# Patient Record
Sex: FEMALE | Race: WHITE | NOT HISPANIC OR LATINO | URBAN - METROPOLITAN AREA
[De-identification: names, ages, dates, MRNs, and addresses within clinical notes are randomized per-mention and may not be internally consistent; named-entity substitution may affect disease eponyms.]

---

## 2017-03-08 ENCOUNTER — APPOINTMENT (OUTPATIENT)
Dept: WOMENS IMAGING | Facility: HOSPITAL | Age: 67
End: 2017-03-08

## 2017-03-08 PROCEDURE — G0202 SCR MAMMO BI INCL CAD: HCPCS | Performed by: RADIOLOGY

## 2017-03-08 PROCEDURE — 77063 BREAST TOMOSYNTHESIS BI: CPT | Performed by: RADIOLOGY

## 2017-03-08 PROCEDURE — MDREVIEWSP: Performed by: RADIOLOGY

## 2018-03-22 ENCOUNTER — APPOINTMENT (OUTPATIENT)
Dept: WOMENS IMAGING | Facility: HOSPITAL | Age: 68
End: 2018-03-22

## 2018-03-22 PROCEDURE — 77067 SCR MAMMO BI INCL CAD: CPT | Performed by: RADIOLOGY

## 2018-03-22 PROCEDURE — MDREVIEWSP: Performed by: RADIOLOGY

## 2018-03-22 PROCEDURE — 77063 BREAST TOMOSYNTHESIS BI: CPT | Performed by: RADIOLOGY

## 2019-03-27 ENCOUNTER — APPOINTMENT (OUTPATIENT)
Dept: WOMENS IMAGING | Facility: HOSPITAL | Age: 69
End: 2019-03-27

## 2019-03-27 PROCEDURE — 77063 BREAST TOMOSYNTHESIS BI: CPT | Performed by: RADIOLOGY

## 2019-03-27 PROCEDURE — 77067 SCR MAMMO BI INCL CAD: CPT | Performed by: RADIOLOGY

## 2019-03-27 PROCEDURE — MDREVIEWSP: Performed by: RADIOLOGY

## 2019-08-15 ENCOUNTER — OFFICE (OUTPATIENT)
Dept: URBAN - METROPOLITAN AREA CLINIC 42 | Facility: CLINIC | Age: 69
End: 2019-08-15

## 2019-08-15 VITALS
DIASTOLIC BLOOD PRESSURE: 80 MMHG | TEMPERATURE: 97.8 F | SYSTOLIC BLOOD PRESSURE: 141 MMHG | WEIGHT: 159 LBS | HEART RATE: 69 BPM | HEIGHT: 66 IN

## 2019-08-15 DIAGNOSIS — K59.00 CONSTIPATION, UNSPECIFIED: ICD-10-CM

## 2019-08-15 DIAGNOSIS — K30 FUNCTIONAL DYSPEPSIA: ICD-10-CM

## 2019-08-15 DIAGNOSIS — R10.84 GENERALIZED ABDOMINAL PAIN: ICD-10-CM

## 2019-08-15 DIAGNOSIS — R14.2 ERUCTATION: ICD-10-CM

## 2019-08-15 DIAGNOSIS — R85.89 OTHER ABNORMAL FINDINGS IN SPECIMENS FROM DIGESTIVE ORGANS A: ICD-10-CM

## 2019-08-15 PROCEDURE — 99204 OFFICE O/P NEW MOD 45 MIN: CPT

## 2019-08-30 ENCOUNTER — ON CAMPUS - OUTPATIENT (OUTPATIENT)
Dept: URBAN - METROPOLITAN AREA HOSPITAL 91 | Facility: HOSPITAL | Age: 69
End: 2019-08-30
Payer: MEDICARE

## 2019-08-30 DIAGNOSIS — R14.0 ABDOMINAL DISTENSION (GASEOUS): ICD-10-CM

## 2019-08-30 DIAGNOSIS — D12.5 BENIGN NEOPLASM OF SIGMOID COLON: ICD-10-CM

## 2019-08-30 DIAGNOSIS — K22.10 ULCER OF ESOPHAGUS WITHOUT BLEEDING: ICD-10-CM

## 2019-08-30 DIAGNOSIS — K29.71 GASTRITIS, UNSPECIFIED, WITH BLEEDING: ICD-10-CM

## 2019-08-30 DIAGNOSIS — R10.12 LEFT UPPER QUADRANT PAIN: ICD-10-CM

## 2019-08-30 DIAGNOSIS — K22.2 ESOPHAGEAL OBSTRUCTION: ICD-10-CM

## 2019-08-30 DIAGNOSIS — Z12.11 ENCOUNTER FOR SCREENING FOR MALIGNANT NEOPLASM OF COLON: ICD-10-CM

## 2019-08-30 DIAGNOSIS — D12.2 BENIGN NEOPLASM OF ASCENDING COLON: ICD-10-CM

## 2019-08-30 DIAGNOSIS — D13.2 BENIGN NEOPLASM OF DUODENUM: ICD-10-CM

## 2019-08-30 DIAGNOSIS — R19.5 OTHER FECAL ABNORMALITIES: ICD-10-CM

## 2019-08-30 DIAGNOSIS — K29.80 DUODENITIS WITHOUT BLEEDING: ICD-10-CM

## 2019-08-30 DIAGNOSIS — R10.13 EPIGASTRIC PAIN: ICD-10-CM

## 2019-08-30 PROCEDURE — 43239 EGD BIOPSY SINGLE/MULTIPLE: CPT | Mod: 59

## 2019-08-30 PROCEDURE — 45385 COLONOSCOPY W/LESION REMOVAL: CPT | Mod: PT

## 2019-08-30 PROCEDURE — 45381 COLONOSCOPY SUBMUCOUS NJX: CPT | Mod: PT

## 2019-08-30 PROCEDURE — 43249 ESOPH EGD DILATION <30 MM: CPT

## 2019-09-26 ENCOUNTER — OFFICE (OUTPATIENT)
Dept: URBAN - METROPOLITAN AREA CLINIC 42 | Facility: CLINIC | Age: 69
End: 2019-09-26

## 2019-09-26 VITALS
HEIGHT: 66 IN | TEMPERATURE: 98.4 F | SYSTOLIC BLOOD PRESSURE: 141 MMHG | HEART RATE: 59 BPM | DIASTOLIC BLOOD PRESSURE: 81 MMHG | WEIGHT: 156 LBS

## 2019-09-26 DIAGNOSIS — K30 FUNCTIONAL DYSPEPSIA: ICD-10-CM

## 2019-09-26 DIAGNOSIS — K22.2 ESOPHAGEAL OBSTRUCTION: ICD-10-CM

## 2019-09-26 DIAGNOSIS — D12.6 BENIGN NEOPLASM OF COLON, UNSPECIFIED: ICD-10-CM

## 2019-09-26 DIAGNOSIS — K21.9 GASTRO-ESOPHAGEAL REFLUX DISEASE WITHOUT ESOPHAGITIS: ICD-10-CM

## 2019-09-26 PROCEDURE — 99213 OFFICE O/P EST LOW 20 MIN: CPT

## 2020-05-05 ENCOUNTER — APPOINTMENT (OUTPATIENT)
Dept: WOMENS IMAGING | Facility: HOSPITAL | Age: 70
End: 2020-05-05

## 2020-05-05 PROCEDURE — 77067 SCR MAMMO BI INCL CAD: CPT | Performed by: RADIOLOGY

## 2020-05-05 PROCEDURE — 77063 BREAST TOMOSYNTHESIS BI: CPT | Performed by: RADIOLOGY

## 2021-05-07 ENCOUNTER — APPOINTMENT (OUTPATIENT)
Dept: WOMENS IMAGING | Facility: HOSPITAL | Age: 71
End: 2021-05-07

## 2021-05-07 PROCEDURE — 77067 SCR MAMMO BI INCL CAD: CPT | Performed by: RADIOLOGY

## 2021-05-07 PROCEDURE — 77063 BREAST TOMOSYNTHESIS BI: CPT | Performed by: RADIOLOGY

## 2022-06-06 ENCOUNTER — APPOINTMENT (OUTPATIENT)
Dept: WOMENS IMAGING | Facility: HOSPITAL | Age: 72
End: 2022-06-06

## 2022-06-06 PROCEDURE — 77067 SCR MAMMO BI INCL CAD: CPT | Performed by: RADIOLOGY

## 2022-06-06 PROCEDURE — 77063 BREAST TOMOSYNTHESIS BI: CPT | Performed by: RADIOLOGY

## 2024-02-22 ENCOUNTER — TELEPHONE (OUTPATIENT)
Dept: RADIATION ONCOLOGY | Facility: HOSPITAL | Age: 74
End: 2024-02-22
Payer: MEDICARE

## 2024-02-22 NOTE — TELEPHONE ENCOUNTER
Pt called to speak about an appointment with Dr. Cain. She has decided to wait until we are able to obtain her records to schedule an appointment. We will give her a call back.

## 2024-02-27 ENCOUNTER — TELEPHONE (OUTPATIENT)
Dept: RADIATION ONCOLOGY | Facility: HOSPITAL | Age: 74
End: 2024-02-27
Payer: MEDICARE

## 2024-02-27 NOTE — TELEPHONE ENCOUNTER
Called pt to let her know I was able to get her records from [facility]. We can go ahead and schedule her with Dr. Cain at her convenience. Pt did not answer, LVM.

## 2024-03-08 ENCOUNTER — TELEPHONE (OUTPATIENT)
Dept: RADIATION ONCOLOGY | Facility: HOSPITAL | Age: 74
End: 2024-03-08
Payer: MEDICARE

## 2024-03-11 ENCOUNTER — LAB (OUTPATIENT)
Dept: LAB | Facility: HOSPITAL | Age: 74
End: 2024-03-11
Payer: MEDICARE

## 2024-03-11 ENCOUNTER — OFFICE VISIT (OUTPATIENT)
Dept: RADIATION ONCOLOGY | Facility: HOSPITAL | Age: 74
End: 2024-03-11
Payer: MEDICARE

## 2024-03-11 VITALS
DIASTOLIC BLOOD PRESSURE: 69 MMHG | OXYGEN SATURATION: 99 % | BODY MASS INDEX: 22.5 KG/M2 | HEART RATE: 63 BPM | SYSTOLIC BLOOD PRESSURE: 138 MMHG | WEIGHT: 139.4 LBS

## 2024-03-11 DIAGNOSIS — C73 FOLLICULAR THYROID CANCER: ICD-10-CM

## 2024-03-11 DIAGNOSIS — C73 FOLLICULAR THYROID CANCER: Primary | ICD-10-CM

## 2024-03-11 LAB
T4 FREE SERPL-MCNC: 1.49 NG/DL (ref 0.93–1.7)
TSH SERPL DL<=0.05 MIU/L-ACNC: 0.17 UIU/ML (ref 0.27–4.2)

## 2024-03-11 PROCEDURE — 84439 ASSAY OF FREE THYROXINE: CPT

## 2024-03-11 PROCEDURE — 84443 ASSAY THYROID STIM HORMONE: CPT

## 2024-03-11 PROCEDURE — 84432 ASSAY OF THYROGLOBULIN: CPT

## 2024-03-11 PROCEDURE — 36415 COLL VENOUS BLD VENIPUNCTURE: CPT

## 2024-03-11 RX ORDER — LIOTHYRONINE SODIUM 5 UG/1
1 TABLET ORAL DAILY
COMMUNITY
Start: 2023-12-20

## 2024-03-11 RX ORDER — MYCOPHENOLATE MOFETIL 500 MG/1
3 TABLET ORAL EVERY 12 HOURS SCHEDULED
COMMUNITY
Start: 2024-02-18

## 2024-03-11 RX ORDER — PANTOPRAZOLE SODIUM 40 MG/1
40 TABLET, DELAYED RELEASE ORAL DAILY
COMMUNITY
Start: 2023-05-15

## 2024-03-11 RX ORDER — FOLIC ACID 1 MG/1
1 TABLET ORAL DAILY
COMMUNITY
Start: 2024-02-24

## 2024-03-11 RX ORDER — BISACODYL 5 MG/1
5 TABLET, DELAYED RELEASE ORAL DAILY PRN
COMMUNITY

## 2024-03-11 RX ORDER — EZETIMIBE 10 MG/1
10 TABLET ORAL DAILY
COMMUNITY

## 2024-03-11 RX ORDER — FERROUS SULFATE 325(65) MG
325 TABLET ORAL DAILY
COMMUNITY

## 2024-03-11 RX ORDER — APIXABAN 5 MG/1
5 TABLET, FILM COATED ORAL 2 TIMES DAILY
COMMUNITY
Start: 2024-03-01

## 2024-03-11 RX ORDER — DILTIAZEM HYDROCHLORIDE 120 MG/1
1 CAPSULE, COATED, EXTENDED RELEASE ORAL DAILY
COMMUNITY
Start: 2024-03-08

## 2024-03-11 RX ORDER — FUROSEMIDE 40 MG/1
40 TABLET ORAL 2 TIMES DAILY
COMMUNITY

## 2024-03-11 RX ORDER — LEVOTHYROXINE SODIUM 0.12 MG/1
125 TABLET ORAL DAILY
COMMUNITY

## 2024-03-11 NOTE — PROGRESS NOTES
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  Ms. moore is a 73-year-old female who has a number of medical problems including severe eczema, degenerative arthritis, atrial fibrillation and papillary carcinoma of the thyroid.  She underwent total thyroidectomy followed by I-131 ablation received a total of 160 mCi dated 7/17/2020.  Repeat scanning demonstrated small area of residual disease which resulted in repeat I-131 administration of 31 mCi dated 7/8/2021.  Repeat scanning after this procedure demonstrated no residual uptake.  Patient has been followed with sequential free T4, TSH and thyroglobulin levels.  She is followed closely every 3 months by Dr. Lior Barry.  Her ENT physician is Dr. Rickey Tom.        Imaging:    XR neck soft tissue  Order: [id number]  Narrative    INDICATION:  Swallowed calcium pills. Patient fees like pills went sideways and got  stuck.    TECHNIQUE:  Two views of the soft tissue neck.    COMPARISON:  06/02/2021 US soft tissue neck .    FINDINGS:    There is no unexpected radiopaque foreign body seen in the soft tissue.  There is  no airway compromise identified.  Epiglottis is within normal limits.  Prevertebral  soft tissues are unremarkable.    IMPRESSION:  No unexpected radiopaque foreign bodies.      Signed by Christine Tolbert MD  ##### Final #####    Dictated by:    CHRISTINE TOLBERT MD-RAD  Dictated DT/TM: 09/18/2022 5:54 pm  Interpreted and electronically signed by:  CHRISTINE TOLBERT MD-RAD  Signed DT/TM:  09/18/2022 6:07 pm  NM I 131 whole body scan  Order: [id number]  Narrative      INDICATION:  Thyroid cancer. Observation of metastatic disease.    DOSE:  #  5 mCi I-131.    COMPARISON:  There are no  previous relevant studies for correlation.    FINDINGS:  Whole body scintigraphic images as well as spot images of the neck and chest were obtained.    No abnormal radiotracer uptake is identified. Some physiologic uptake within the stomach and small bowel.    IMPRESSION:  No evidence of metastatic disease.      Dictated by:Rah Bingham MD  Dictated DT/TM:  7/8/2022 12:22 PM  Signed by:  Rah Bingham MD  Signed DT/TM:  7/8/2022 12:24 PM  : EDDY/PHOENIX:  ##### Final #####    Dictated by:    RAH BINGHAM MD-RAD  Dictated DT/TM: 07/08/2022 12:24 pm  Interpreted and electronically signed by:  RAH BINGHAM MD-RAD  Signed DT/TM:  07/08/2022 12:24 pm  CT Abdomen Pelvis Without Contrast  Order: [id number]  Narrative      INDICATION:  Gross hematuria    TECHNIQUE:  CT of the abdomen and pelvis without contrast. Coronal and sagittal reconstructions were obtained.  Radiation dose reduction techniques included automated exposure control or exposure modulation based on body size.  Number of CT scans and/or myocardial perfusion studies in the past 12 months: 0.    COMPARISON:  6/15/2018.    FINDINGS:  ABDOMEN:  A 0.8 x 0.5 cm calculus in the proximal left collecting system. No hydronephrosis. The internal density of the stone is 1100 HU. There are smaller nonobstructing left renal calculi.    The remaining solid abdominal organs are normal. The gallbladder is surgically absent.  The bowel is not dilated.  The appendix is normal.    PELVIS:    No pelvic mass or free pelvic fluid.  No enlarged pelvic or inguinal lymph nodes.    No acute osseous abnormalities.    IMPRESSION:    1. 0.8 cm calculus in the proximal left renal collecting system, however no significant hydronephrosis.  2. Nonobstructing left renal calculi.        Dictated by: Rah Bingham MD Signed by Rah Bingham MD on 1/23/2024 13:49      Labs:   Latest Reference Range & Units 02/27/24 10:04   WBC 4.5 - 11.0 10*3/uL 4.01 (L) (E)   RBC 4.0 - 5.2 10*6/uL 3.18  (L) (E)   Hemoglobin 12.0 - 16.0 g/dL 9.2 (L) (E)   Hematocrit 36.0 - 46.0 % 29.9 (L) (E)   Platelets 140 - 440 10*3/uL 343 (E)   RDW 12.0 - 16.8 % 13.2 (E)   MCV 80.0 - 100.0 fL 94.0 (E)   MCH 26.0 - 34.0 pg 28.9 (E)   MCHC 31.0 - 37.0 g/dL 30.8 (L) (E)   MPV 8.4 - 12.4 fL 9.5 (E)   (L): Data is abnormally low  (E): External lab result    Lab Results   Component Value Date    CREATININE 1.62 (H) 03/24/2022                 Problem List:  Severe eczema requiring immunosuppression  Degenerative arthritis status post bilateral hip replacements   Atrial fibrillation  4.  Papillary adenocarcinoma of the thyroid  5.  Chronic renal calculi status post lithotripsy    Medications:  Current Outpatient Medications on File Prior to Visit   Medication Sig Dispense Refill    dilTIAZem CD (CARDIZEM CD) 120 MG 24 hr capsule Take 1 capsule by mouth Daily.      Eliquis 5 MG tablet tablet Take 1 tablet by mouth 2 (Two) Times a Day.      folic acid (FOLVITE) 1 MG tablet Take 1 tablet by mouth Daily.      liothyronine (CYTOMEL) 5 MCG tablet Take 1 tablet by mouth Daily.      metoprolol tartrate (LOPRESSOR) 25 MG tablet Take 1 tablet by mouth 2 (Two) Times a Day.      mycophenolate (CELLCEPT) 500 MG tablet Take 3 tablets by mouth Every 12 (Twelve) Hours.      pantoprazole (PROTONIX) 40 MG EC tablet Take 1 tablet by mouth Daily.      bisacodyl (DULCOLAX) 5 MG EC tablet Take 1 tablet by mouth Daily As Needed.      Calcium-Magnesium-Vitamin D - MG-MG-UNIT tablet sustained-release 24 hour Take 1 tablet by mouth 2 (Two) Times a Day.      ezetimibe (ZETIA) 10 MG tablet Take 1 tablet by mouth Daily.      ferrous sulfate 325 (65 FE) MG tablet Take 1 tablet by mouth Daily.      furosemide (LASIX) 40 MG tablet Take 1 tablet by mouth 2 (Two) Times a Day.       No current facility-administered medications on file prior to visit.          Allergies:  Not on File        Vital Signs:  /69   Pulse 63   Wt 63.2 kg (139 lb 6.4 oz)    "SpO2 99%   BMI 22.50 kg/m²   Estimated body mass index is 22.5 kg/m² as calculated from the following:    Height as of 5/29/14: 167.6 cm (66\").    Weight as of this encounter: 63.2 kg (139 lb 6.4 oz).  Pain Score    03/11/24 1013   PainSc: 0-No pain         ECOG: Restricted in physically strenuous activity but ambulatory and able to carry out work of a light or sedentary nature, e.g., light house work, office work = 1    Physical Exam  Constitutional:       Appearance: Normal appearance. She is normal weight.   HENT:      Head: Normocephalic and atraumatic.      Nose: Nose normal.      Mouth/Throat:      Mouth: Mucous membranes are moist.   Eyes:      Extraocular Movements: Extraocular movements intact.      Conjunctiva/sclera: Conjunctivae normal.      Pupils: Pupils are equal, round, and reactive to light.   Cardiovascular:      Rate and Rhythm: Normal rate and regular rhythm.   Pulmonary:      Effort: Pulmonary effort is normal.      Breath sounds: Normal breath sounds.   Abdominal:      General: Abdomen is flat.      Palpations: Abdomen is soft.   Musculoskeletal:         General: Normal range of motion.      Cervical back: Rigidity present.   Skin:     General: Skin is warm.   Neurological:      General: No focal deficit present.      Mental Status: She is alert and oriented to person, place, and time.   Psychiatric:         Mood and Affect: Mood normal.         Behavior: Behavior normal.         Thought Content: Thought content normal.                     Diagnoses and all orders for this visit:    1. Follicular thyroid cancer (Primary)  -     TSH+Free T4; Future  -     Thyroglobulin; Future        Assessment:    Ms. moore is stable at this time from an oncologic standpoint.  She has not had recent thyroid labs and we will order those today and forward them to Dr. Barry so that he may review those at his quarterly exam next week    Plan:    Free T4, TSH and serum thyroglobulin levels today.  Patient will " return for follow-up evaluation in 1 year.       I spent 20 minutes caring for Ale on this date of service. This time includes time spent by me in the following activities:reviewing tests, obtaining and/or reviewing a separately obtained history, and counseling and educating the patient/family/caregiver    Patient was given instructions and counseling regarding her condition or for health maintenance advice. Please see specific information pulled into the AVS if appropriate.     Sanjiv Cain MD

## 2024-03-18 LAB — THYROGLOB SERPL-MCNC: <2 NG/ML

## 2024-06-10 ENCOUNTER — APPOINTMENT (OUTPATIENT)
Dept: WOMENS IMAGING | Facility: HOSPITAL | Age: 74
End: 2024-06-10
Payer: MEDICARE

## 2024-06-10 PROCEDURE — 77063 BREAST TOMOSYNTHESIS BI: CPT | Performed by: RADIOLOGY

## 2024-06-10 PROCEDURE — 77067 SCR MAMMO BI INCL CAD: CPT | Performed by: RADIOLOGY

## 2025-01-23 ENCOUNTER — TELEPHONE (OUTPATIENT)
Dept: RADIATION ONCOLOGY | Facility: HOSPITAL | Age: 75
End: 2025-01-23
Payer: MEDICARE

## 2025-01-23 NOTE — TELEPHONE ENCOUNTER
Called pt to inform her that unfortunately Dr. aCin is no longer with [facility] and we would need to reschedule her appt and transfer her to another provider. I advised her that Dr. Joseph was taking thyroid pts of Dr. Cain. Pt wishes to speak to her primary care provider to see if he thinks she should schedule with Dr. Joseph or go to [facility]. Pt stated that she would call us back next week to let us know.

## 2025-03-23 NOTE — PROGRESS NOTES
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  Ms. moore is a 74-year-old female who has a number of medical problems including severe eczema, degenerative arthritis, atrial fibrillation and papillary carcinoma of the thyroid.  She underwent total thyroidectomy followed by I-131 ablation received a total of 160 mCi dated 7/17/2020.  Repeat scanning demonstrated small area of residual disease which resulted in repeat I-131 administration of 31 mCi dated 7/8/2021.  Repeat scanning after this procedure demonstrated no residual uptake.  Patient has been followed with sequential free T4, TSH and thyroglobulin levels.  She is followed closely every 3 months by Dr. Lior Barry.  Her ENT physician is Dr. Rickey Tom.  She has no new or concerning symptoms.      Imaging:      No new relevant imaging       Pathology:      No new relevant pathology       Labs:    Lab Results   Component Value Date    CREATININE 1.33 (H) 06/26/2024        Latest Reference Range & Units 02/03/25 08:17   TSH Baseline 0.40 - 4.50 mIU/L 0.18 (L) (E)   Free T4 0.8 - 1.8 ng/dL 1.4 (E)   T3, Free 2.3 - 4.2 pg/mL 3.5 (E)   (L): Data is abnormally low  (E): External lab result            Problem List:  There is no problem list on file for this patient.         Medications:  Current Outpatient Medications on File Prior to Visit   Medication Sig Dispense Refill    bisacodyl (DULCOLAX) 5 MG EC tablet Take 1 tablet by mouth Daily As Needed.      Calcium-Magnesium-Vitamin D - MG-MG-UNIT tablet sustained-release 24 hour Take 1 tablet by mouth 2 (Two) Times a Day.      dilTIAZem CD (CARDIZEM CD) 120 MG 24 hr capsule Take 1 capsule by mouth Daily.       ezetimibe (ZETIA) 10 MG tablet Take 1 tablet by mouth Daily.      ferrous sulfate 325 (65 FE) MG tablet Take 1 tablet by mouth Daily.      folic acid (FOLVITE) 1 MG tablet Take 1 tablet by mouth Daily.      furosemide (LASIX) 40 MG tablet Take 1 tablet by mouth 2 (Two) Times a Day.      levothyroxine (SYNTHROID, LEVOTHROID) 125 MCG tablet Take 1 tablet by mouth Daily.      liothyronine (CYTOMEL) 5 MCG tablet Take 1 tablet by mouth Daily.      pantoprazole (PROTONIX) 40 MG EC tablet Take 1 tablet by mouth Daily.      Eliquis 5 MG tablet tablet Take 1 tablet by mouth 2 (Two) Times a Day. (Patient not taking: Reported on 3/24/2025)      metoprolol tartrate (LOPRESSOR) 25 MG tablet Take 1 tablet by mouth 2 (Two) Times a Day.      mycophenolate (CELLCEPT) 500 MG tablet Take 3 tablets by mouth Every 12 (Twelve) Hours. (Patient not taking: Reported on 3/24/2025)      rosuvastatin (CRESTOR) 5 MG tablet Take 1 tablet by mouth Daily.       No current facility-administered medications on file prior to visit.          Allergies:  Allergies   Allergen Reactions    Procaine Other (See Comments) and Unknown (See Comments)     Increase B/P & HR    Triamcinolone Acetonide Other (See Comments)     Increase B/P, increase HR    Chlorpromazine Other (See Comments)     INCREASE BP    [place] Other (See Comments)     From allergy testing    Lidocaine Other (See Comments)    Nickel Other (See Comments)     From allergy testing    Other reaction(s): Other (See Comments)   From allergy testing    Doxycycline Diarrhea and Rash    Epinephrine Other (See Comments) and Unknown (See Comments)     Increase B/P & HR    Prednisone Other (See Comments) and Unknown (See Comments)     Agitation , increase B/P, difficulty sleeping    Agitation , increase B/P    Sulfa Antibiotics Rash           Vital Signs:  /70   Pulse 58   Wt 68.7 kg (151 lb 6.4 oz)   SpO2 98%   Estimated body mass index is 22.5 kg/m² as calculated from the following:    Height  "as of 5/29/14: 167.6 cm (66\").    Weight as of 3/11/24: 63.2 kg (139 lb 6.4 oz).  Pain Score    03/24/25 0847   PainSc: 0-No pain         ECOG: Fully active, able to carry on all pre-disease performance without restriction = 0      Physical Exam  Vitals reviewed.   Constitutional:       General: She is not in acute distress.     Appearance: Normal appearance.   HENT:      Head: Normocephalic and atraumatic.   Eyes:      Extraocular Movements: Extraocular movements intact.      Pupils: Pupils are equal, round, and reactive to light.   Pulmonary:      Effort: Pulmonary effort is normal.   Abdominal:      General: Abdomen is flat.      Palpations: Abdomen is soft.   Musculoskeletal:      Cervical back: Normal range of motion.   Skin:     General: Skin is warm and dry.   Neurological:      General: No focal deficit present.      Mental Status: She is alert and oriented to person, place, and time.   Psychiatric:         Mood and Affect: Mood normal.         Behavior: Behavior normal.          Result Review :  The following data was reviewed by: Teddy Joseph MD on 03/24/2025:  Labs: Last Creatinine, TSH, Free T4            Diagnoses and all orders for this visit:    1. Follicular thyroid cancer (Primary)        Assessment:    Ale Moore presents for follow-up evaluation today after having last been seen May 2024.  Ms. moore is a 74-year-old female who has a number of medical problems including severe eczema, degenerative arthritis, atrial fibrillation and papillary carcinoma of the thyroid.  She underwent total thyroidectomy followed by I-131 ablation received a total of 160 mCi dated 7/17/2020.  Repeat scanning demonstrated small area of residual disease which resulted in repeat I-131 administration of 31 mCi dated 7/8/2021.  Repeat scanning after this procedure demonstrated no residual uptake.  Patient has been followed with sequential free T4, TSH and thyroglobulin levels.  She is followed closely every 3 months " by Dr. Lior Barry.  Her ENT physician is Dr. Rickey Tom.  She has no new or concerning symptoms.    I met with the patient and reviewed her interval history in detail.  Overall, her recent thyroid labs were stable.  She does not have updated thyroglobulin and thyroglobulin antibody testing.  Will order that.  Otherwise, she can follow-up with me in 1 year with repeat labs.      Plan:    -Thyroglobulin and thyroglobulin antibody test now  -Will repeat thyroid labs and see her back in 1 year       I spent 20 minutes caring for Ale on this date of service. This time includes time spent by me in the following activities:preparing for the visit, reviewing tests, obtaining and/or reviewing a separately obtained history, documenting information in the medical record, independently interpreting results and communicating that information with the patient/family/caregiver, and care coordination  Follow Up   No follow-ups on file.  Patient was given instructions and counseling regarding her condition or for health maintenance advice. Please see specific information pulled into the AVS if appropriate.     Teddy Joseph MD

## 2025-03-24 ENCOUNTER — OFFICE VISIT (OUTPATIENT)
Dept: RADIATION ONCOLOGY | Facility: HOSPITAL | Age: 75
End: 2025-03-24
Payer: MEDICARE

## 2025-03-24 ENCOUNTER — LAB (OUTPATIENT)
Dept: LAB | Facility: HOSPITAL | Age: 75
End: 2025-03-24
Payer: MEDICARE

## 2025-03-24 VITALS
DIASTOLIC BLOOD PRESSURE: 70 MMHG | SYSTOLIC BLOOD PRESSURE: 144 MMHG | HEART RATE: 58 BPM | OXYGEN SATURATION: 98 % | WEIGHT: 151.4 LBS

## 2025-03-24 DIAGNOSIS — C73 FOLLICULAR THYROID CANCER: Primary | ICD-10-CM

## 2025-03-24 PROCEDURE — 86800 THYROGLOBULIN ANTIBODY: CPT | Performed by: STUDENT IN AN ORGANIZED HEALTH CARE EDUCATION/TRAINING PROGRAM

## 2025-03-24 PROCEDURE — 36415 COLL VENOUS BLD VENIPUNCTURE: CPT | Performed by: STUDENT IN AN ORGANIZED HEALTH CARE EDUCATION/TRAINING PROGRAM

## 2025-03-24 PROCEDURE — G0463 HOSPITAL OUTPT CLINIC VISIT: HCPCS | Performed by: STUDENT IN AN ORGANIZED HEALTH CARE EDUCATION/TRAINING PROGRAM

## 2025-03-24 PROCEDURE — 84432 ASSAY OF THYROGLOBULIN: CPT | Performed by: STUDENT IN AN ORGANIZED HEALTH CARE EDUCATION/TRAINING PROGRAM

## 2025-03-24 RX ORDER — ROSUVASTATIN CALCIUM 5 MG/1
5 TABLET, COATED ORAL DAILY
COMMUNITY

## 2025-03-25 LAB — THYROGLOB AB SERPL-ACNC: 2.5 IU/ML (ref 0–0.9)

## 2025-03-30 LAB — THYROGLOB SERPL-MCNC: <2 NG/ML

## 2025-06-16 ENCOUNTER — APPOINTMENT (OUTPATIENT)
Dept: WOMENS IMAGING | Facility: HOSPITAL | Age: 75
End: 2025-06-16
Payer: MEDICARE

## 2025-06-16 PROCEDURE — 77067 SCR MAMMO BI INCL CAD: CPT | Performed by: RADIOLOGY

## 2025-06-16 PROCEDURE — 77063 BREAST TOMOSYNTHESIS BI: CPT | Performed by: RADIOLOGY
